# Patient Record
Sex: MALE | Race: WHITE | Employment: FULL TIME | URBAN - METROPOLITAN AREA
[De-identification: names, ages, dates, MRNs, and addresses within clinical notes are randomized per-mention and may not be internally consistent; named-entity substitution may affect disease eponyms.]

---

## 2024-11-20 ENCOUNTER — OFFICE VISIT (OUTPATIENT)
Age: 34
End: 2024-11-20

## 2024-11-20 VITALS
SYSTOLIC BLOOD PRESSURE: 115 MMHG | HEIGHT: 71 IN | RESPIRATION RATE: 18 BRPM | DIASTOLIC BLOOD PRESSURE: 78 MMHG | WEIGHT: 188.8 LBS | OXYGEN SATURATION: 96 % | HEART RATE: 85 BPM | BODY MASS INDEX: 26.43 KG/M2

## 2024-11-20 DIAGNOSIS — B99.9 INFECTIOUS FOLLICULITIS: Primary | ICD-10-CM

## 2024-11-20 DIAGNOSIS — L73.8 INFECTIOUS FOLLICULITIS: Primary | ICD-10-CM

## 2024-11-20 RX ORDER — DOXYCYCLINE HYCLATE 100 MG
100 TABLET ORAL 2 TIMES DAILY
Qty: 14 TABLET | Refills: 0 | Status: SHIPPED | OUTPATIENT
Start: 2024-11-20 | End: 2024-11-27

## 2024-11-21 NOTE — PROGRESS NOTES
2024   Dariusz Castle (: 1990) is a 33 y.o. male, New patient, here for evaluation of the following chief complaint(s):  Other (Possible infection ( bump) in left leg right above the knee)     ASSESSMENT/PLAN:  Below is the assessment and plan developed based on review of pertinent history, physical exam, labs, studies, and medications.  1. Infectious folliculitis  -     doxycycline hyclate (VIBRA-TABS) 100 MG tablet; Take 1 tablet by mouth 2 times daily for 7 days, Disp-14 tablet, R-0Normal      The exam did not reveal an ill appearing patient, a toxic-appearing patient, vital signs that were significantly abnormal, a decreased O2 saturation or respiratory distress.  The exam did not reveal dehydration, altered mental status, listlessness or lethargy.  Patient presents with an exam consistent with cellulitis.  The patient does not have evidence for abscess or underlying foreign body.       The patient appears to be a good candidate for outpatient therapy at this time based on generally favorable clinical appearance and ability to tolerate PO medications.  Patient will be treated on an outpatient basis with oral antibiotics.  Appropriate antibiotics have been provided, with selection based on likely pathogens.    -Wound irrigated, cleaned, antibiotic ointment applied and dressing applied  -Educational material and patient instructions provided  -Advised patient to immediately go to the ER if systemic signs of toxicity should arise, rapid progression of erythema, and/or progression of clinical findings after 48 hours of oral antibiotic therapy.      Follow up:  Return in about 5 days (around 2024).  Follow up immediately for any new, worsening or changes or if symptoms are not improving over the next 5-7 days.     SUBJECTIVE/OBJECTIVE:  33 y.o. male presents concern for possible skin infection to left upper thigh and right lateral ankle.  States the areas initially started out as a hopson that

## 2024-11-21 NOTE — PATIENT INSTRUCTIONS
Thank you for visiting [facility] today.    Take oral antibiotics on a full stomach until you complete the entire prescription.   Take a probiotic while you are using the antibiotic.    Elevate the area - elevating the arm or leg above the level of the heart can help to reduce swelling and speed healing.     it is important to keep the infected area clean and dry.   You can shower or bathe normally and pat the area dry with a clean towel.  You can use a bandage or gauze to protect the skin if needed.  You may use warm, moist compresses for pain relief.        Please follow up with your PCP within 2 days if your signs and symptoms have not resolved or worsened.    Please go immediately to the ED if you develop fever of 100.4F or above, chills, vomiting, worsening redness/pain/swelling to affected area, red streaks, and/or no improvement after 48 hours of oral antibiotic therapy.